# Patient Record
Sex: MALE | Race: WHITE
[De-identification: names, ages, dates, MRNs, and addresses within clinical notes are randomized per-mention and may not be internally consistent; named-entity substitution may affect disease eponyms.]

---

## 2020-05-02 ENCOUNTER — HOSPITAL ENCOUNTER (EMERGENCY)
Dept: HOSPITAL 41 - JD.ED | Age: 40
Discharge: HOME | End: 2020-05-02
Payer: SELF-PAY

## 2020-05-02 DIAGNOSIS — Z88.0: ICD-10-CM

## 2020-05-02 DIAGNOSIS — R00.2: Primary | ICD-10-CM

## 2020-05-02 DIAGNOSIS — Z79.899: ICD-10-CM

## 2020-05-02 DIAGNOSIS — I10: ICD-10-CM

## 2020-05-02 PROCEDURE — 93005 ELECTROCARDIOGRAM TRACING: CPT

## 2020-05-02 PROCEDURE — 84443 ASSAY THYROID STIM HORMONE: CPT

## 2020-05-02 PROCEDURE — 36415 COLL VENOUS BLD VENIPUNCTURE: CPT

## 2020-05-02 PROCEDURE — 80053 COMPREHEN METABOLIC PANEL: CPT

## 2020-05-02 PROCEDURE — 99285 EMERGENCY DEPT VISIT HI MDM: CPT

## 2020-05-02 PROCEDURE — 85025 COMPLETE CBC W/AUTO DIFF WBC: CPT

## 2020-05-02 PROCEDURE — 93225 XTRNL ECG REC<48 HRS REC: CPT

## 2020-05-02 PROCEDURE — 93226 XTRNL ECG REC<48 HR SCAN A/R: CPT

## 2020-05-02 PROCEDURE — 83735 ASSAY OF MAGNESIUM: CPT

## 2020-05-02 NOTE — EDM.PDOC
ED HPI GENERAL MEDICAL PROBLEM





- General


Chief Complaint: Cardiovascular Problem


Stated Complaint: CAMMY MCCLELLAN


Time Seen by Provider: 05/02/20 15:27


Source of Information: Reports: Patient


History Limitations: Reports: No Limitations





- History of Present Illness


INITIAL COMMENTS - FREE TEXT/NARRATIVE: 





Ancelmo Warren 39-year-old male who presents the emergency room chief 

complaints of high resting pulse rate.  Patient states he was sitting at home 

when he felt like his heart was racing he checked his pulse and it was 

elevated.  He denies any chest pain or shortness of breath.  Patient has a 

history of tachycardia and currently is taking Lopressor and HCTZ.  His primary 

care doctor is Dr. Garrett who is managing this.


Onset: Today, Sudden


Onset Date: 05/02/20


Onset Time: 14:00


Duration: Intermittent


Location: Reports: Chest


Severity: Mild


Improves with: Reports: None


Worsens with: Reports: None


Associated Symptoms: Denies: Chest Pain, Diaphoresis, Fever/Chills, Nausea/

Vomiting, Shortness of Breath





- Related Data


 Allergies











Allergy/AdvReac Type Severity Reaction Status Date / Time


 


Penicillins Allergy Severe Rash Verified 05/02/20 15:26











Home Meds: 


 Home Meds





Metoprolol Succinate 50 mg PO DAILY 05/02/20 [History]


hydroCHLOROthiazide [Hydrochlorothiazide] 12.5 mg PO DAILY 05/02/20 [History]











Past Medical History


Cardiovascular History: Reports: Hypertension





- Past Surgical History


GI Surgical History: Reports: Cholecystectomy





Social & Family History





- Tobacco Use


Smoking Status *Q: Never Smoker


Second Hand Smoke Exposure: No





- Caffeine Use


Caffeine Use: Reports: None





- Recreational Drug Use


Recreational Drug Use: No





ED ROS GENERAL





- Review of Systems


Review Of Systems: See Below


Constitutional: Denies: Fever, Chills


Respiratory: Denies: Shortness of Breath


Cardiovascular: Reports: Palpitations, Other (fast heart beat).  Denies: Chest 

Pain


Endocrine: Reports: No Symptoms


GI/Abdominal: Reports: No Symptoms


: Reports: No Symptoms


Musculoskeletal: Reports: No Symptoms


Skin: Reports: No Symptoms


Neurological: Reports: No Symptoms


Psychiatric: Reports: No Symptoms


Hematologic/Lymphatic: Reports: No Symptoms


Immunologic: Reports: No Symptoms





ED EXAM, GENERAL





- Physical Exam


Exam: See Below


Exam Limited By: No Limitations


General Appearance: Alert, WD/WN, No Apparent Distress


Respiratory/Chest: No Respiratory Distress, Lungs Clear, Normal Breath Sounds, 

No Accessory Muscle Use, Chest Non-Tender


Cardiovascular: Normal Peripheral Pulses, Regular Rate, Rhythm, No Edema, No 

Gallop, No JVD, No Murmur, No Rub


GI/Abdominal: Normal Bowel Sounds, Soft, Non-Tender, No Organomegaly, No 

Distention, No Abnormal Bruit


Back Exam: Normal Inspection, Full Range of Motion


Extremities: Normal Inspection, Normal Range of Motion, Non-Tender, No Pedal 

Edema, Normal Capillary Refill


Neurological: Alert, Oriented, Normal Cognition, Normal Gait, No Motor/Sensory 

Deficits


Psychiatric: Normal Affect, Normal Mood


Skin Exam: Warm, Dry, Intact, Normal Color, No Rash


Lymphatic: No Adenopathy





EKG INTERPRETATION


EKG Date: 05/02/20


Time: 15:30


Rhythm: NSR


Rate (Beats/Min): 93





Course





- Vital Signs


Text/Narrative:: 





Ancelmo Warren-year-old male who presents the emergency room chief complaints 

of fast heart rate.  He reports about 2 hours ago he felt like his heart was 

racing.  He denies any chest pain, shortness of breath lightheadedness or 

syncope.  We will order an EKG to evaluate his rhythm, CBC chemistry and 

magnesium.  Patient is not short of breath saturation is 99% on room air I do 

not feel he needs a chest x-ray at this time.


Last Recorded V/S: 


 Last Vital Signs











Temp  98 F   05/02/20 15:24


 


Pulse  90   05/02/20 15:24


 


Resp  12   05/02/20 15:24


 


BP  124/82   05/02/20 15:24


 


Pulse Ox  99   05/02/20 15:24














- Orders/Labs/Meds


Orders: 


 Active Orders 24 hr











 Category Date Time Status


 


 EKG Documentation Completion [RC] STAT Care  05/02/20 15:34 Active


 


 Holter Monitor 24 Hours [RC] .PRN Care  05/02/20 16:40 Active











Labs: 


 Laboratory Tests











  05/02/20 05/02/20 05/02/20 Range/Units





  15:25 15:25 15:25 


 


WBC  7.98    (4.23-9.07)  K/mm3


 


RBC  5.13    (4.63-6.08)  M/mm3


 


Hgb  15.9    (13.7-17.5)  gm/dl


 


Hct  46.1    (40.1-51.0)  %


 


MCV  89.9    (79.0-92.2)  fl


 


MCH  31.0    (25.7-32.2)  pg


 


MCHC  34.5    (32.2-35.5)  g/dl


 


RDW Std Deviation  42.4    (35.1-43.9)  fL


 


Plt Count  380 H    (163-337)  K/mm3


 


MPV  9.5    (9.4-12.3)  fl


 


Neut % (Auto)  67.2    (34.0-67.9)  %


 


Lymph % (Auto)  19.9 L    (21.8-53.1)  %


 


Mono % (Auto)  6.6    (5.3-12.2)  %


 


Eos % (Auto)  5.8    (0.8-7.0)  


 


Baso % (Auto)  0.4    (0.1-1.2)  %


 


Neut # (Auto)  5.36    (1.78-5.38)  K/mm3


 


Lymph # (Auto)  1.59    (1.32-3.57)  K/mm3


 


Mono # (Auto)  0.53    (0.30-0.82)  K/mm3


 


Eos # (Auto)  0.46    (0.04-0.54)  K/mm3


 


Baso # (Auto)  0.03    (0.01-0.08)  K/mm3


 


Sodium   141   (136-145)  mEq/L


 


Potassium   4.0   (3.5-5.1)  mEq/L


 


Chloride   103   ()  mEq/L


 


Carbon Dioxide   26   (21-32)  mEq/L


 


Anion Gap   16.0 H   (5-15)  


 


BUN   11   (7-18)  mg/dL


 


Creatinine   0.9   (0.7-1.3)  mg/dL


 


Est Cr Clr Drug Dosing   102.01   mL/min


 


Estimated GFR (MDRD)   > 60   (>60)  mL/min


 


BUN/Creatinine Ratio   12.2 L   (14-18)  


 


Glucose   109 H   ()  mg/dL


 


Calcium   9.8   (8.5-10.1)  mg/dL


 


Magnesium   1.6 L   (1.8-2.4)  mg/dl


 


Total Bilirubin   0.6   (0.2-1.0)  mg/dL


 


AST   23   (15-37)  U/L


 


ALT   56   (16-63)  U/L


 


Alkaline Phosphatase   74   ()  U/L


 


Total Protein   7.5   (6.4-8.2)  g/dl


 


Albumin   4.2   (3.4-5.0)  g/dl


 


Globulin   3.3   gm/dL


 


Albumin/Globulin Ratio   1.3   (1-2)  


 


TSH 3rd Generation    3.747 H  (0.358-3.74)  uIU/mL











Meds: 


Medications














Discontinued Medications














Generic Name Dose Route Start Last Admin





  Trade Name Danuta  PRN Reason Stop Dose Admin


 


Magnesium Oxide  400 mg  05/02/20 16:42  





  Magnesium Oxide  PO  05/02/20 16:43  





  ONETIME ONE   





     





     





     





     














- Re-Assessments/Exams


Free Text/Narrative Re-Assessment/Exam: 





05/02/20 16:15


EKG revealed NSR rate 93 no ectopy.


05/02/20 16:31


Labs as follows wound WBC 7.98 H&H 15.9 and 46.1 platelet count 380 sodium 141, 

potassium 4.0, chloride 103, glucose 109, calcium 9.8, magnesium 1.6, CO2 26, 

bun 11 and creatinine 0.9.  I will medicate with magnesium.


05/02/20 17:16


TSH is elevated at 3.747.  Reports that he feels better.  His heart rate is 85 

with sinus rhythm.  I will discharge home with a Holter monitor.  Instructed 

patient to follow-up with Dr. Garrett by next week for further evaluation and 

treatment.  Patient verbalized understanding and is comfortable plan for 

discharge.  Patient stable time discharge.





Departure





- Departure


Time of Disposition: 17:17


Disposition: Home, Self-Care 01


Clinical Impression: 


 Palpitations





Instructions:  Palpitations, Easy-to-Read


Referrals: 


PCP,None [Primary Care Provider] - 


Forms:  ED Department Discharge


Additional Instructions: 


You were seen and evaluated for increased heart beat.  Your EKG revealed normal 

sinus rhythm.  Your labs were unremarkable except for your magnesium was 

slightly low your TSH level was high.  You were to wear your Holter monitor for 

the next 24 hours and follow-up with Dr. Hurd next week for further evaluation 

and to review the results of your monitor.  Return to the emergency room for 

any new or acute worsening symptoms.





Sepsis Event Note





- Evaluation


Sepsis Screening Result: No Definite Risk





- Focused Exam


Vital Signs: 


 Vital Signs











  Temp Pulse Resp BP Pulse Ox


 


 05/02/20 15:24  98 F  90  12  124/82  99











Date Exam was Performed: 05/02/20


Time Exam was Performed: 17:16





- My Orders


Last 24 Hours: 


My Active Orders





05/02/20 15:34


EKG Documentation Completion [RC] STAT 





05/02/20 16:40


Holter Monitor 24 Hours [RC] .PRN 














- Assessment/Plan


Last 24 Hours: 


My Active Orders





05/02/20 15:34


EKG Documentation Completion [RC] STAT 





05/02/20 16:40


Holter Monitor 24 Hours [RC] .PRN